# Patient Record
Sex: MALE | Race: WHITE
[De-identification: names, ages, dates, MRNs, and addresses within clinical notes are randomized per-mention and may not be internally consistent; named-entity substitution may affect disease eponyms.]

---

## 2016-07-14 VITALS
SYSTOLIC BLOOD PRESSURE: 151 MMHG | SYSTOLIC BLOOD PRESSURE: 151 MMHG | DIASTOLIC BLOOD PRESSURE: 73 MMHG | SYSTOLIC BLOOD PRESSURE: 151 MMHG | DIASTOLIC BLOOD PRESSURE: 73 MMHG | SYSTOLIC BLOOD PRESSURE: 151 MMHG | DIASTOLIC BLOOD PRESSURE: 73 MMHG | SYSTOLIC BLOOD PRESSURE: 151 MMHG | DIASTOLIC BLOOD PRESSURE: 73 MMHG | DIASTOLIC BLOOD PRESSURE: 73 MMHG

## 2017-10-25 NOTE — [facility]
EXAM: Chest, 2 views.

 

HISTORY: Shortness of breath.

 

COMPARISON: None.

 

FINDINGS: Frontal and lateral views of the chest are obtained. There is 

right infrahilar atelectasis or infiltrate. There is no consolidation, 

effusion or pneumothorax. The heart is normal in size.

 

IMPRESSION: Right infrahilar atelectasis or infiltrate.

 

Electronically signed by: Laurence Jurado MD (10/25/2017 2:57 PM) 

[facility]1

## 2017-10-27 NOTE — [facility]
PQRS Compliance Statement:

 

One or more of the following individualized dose reduction techniques were

utilized for this examination:  

1. Automated exposure control  

2. Adjustment of the mA and/or kV according to patient size  

3. Use of iterative reconstruction technique

 

 

CT CHEST WO CONTRAST

 

Clinical Indication: Lung nodule. Abnormal chest radiograph.

 

Comparison: Two-view chest, 2 days ago.

 

Technique: Helical CT imaging of the chest is performed without IV 

contrast.

 

Findings: 

Suspect bilateral hilar adenopathy, limited evaluation without IV 

contrast. There is mediastinal adenopathy. For example there is a 2 x 3 cm

subcarinal lymph node. There are 1.3 and 1.4 cm right paratracheal lymph 

nodes. The great vessels are normal caliber. Mild coronary artery disease.

Cardiac size normal, trace pericardial fluid. Cannot exclude wall 

thickening of the distal esophagus.

 

The central airways are patent. There is minimal atelectasis or scarring 

in the medial right lower lobe. There is no consolidation. Tiny nodule 

along the left major fissure in the left lower lobe may be an 

intrapulmonary lymph node. There is a tiny calcified granuloma just 

lateral. No suspicious pulmonary nodule. No pleural abnormality.

 

There is probable hepatosplenomegaly. Liver and spleen are incompletely 

imaged. Borderline enlarged gastrohepatic lymph nodes.

 

No acute bone abnormality. Degenerative endplate spurring in the thoracic 

spine.

 

IMPRESSION:

1. Mediastinal and probable bilateral hilar adenopathy. 

Borderline-enlarged gastrohepatic lymph nodes. Lymphoproliferative 

disorder or metastatic disease are in the differential. Recommend CT chest

with contrast in 3 months to reevaluate.

2. No lung consolidation. No suspicious pulmonary nodule.

3. There may be wall thickening of the distal esophagus. Considerations 

include esophagitis, Guthrie's esophagus, or esophageal neoplasm.

4. Probable hepatosplenomegaly.

 

Electronically signed by: Jude Christine MD (10/27/2017 1:35 PM) [id number]

## 2017-12-19 NOTE — SLEEP
DATE OF STUDY:  12/18/2017



SLEEP STUDY



ATTENDING PHYSICIAN:  Dr. Oswaldo Santos.



The patient is 57 years old who weighs 314 pounds with a BMI of 44.  The patient

had a history of sleep apnea diagnosed in 2008 and needed another sleep study to

requalify for a new machine.



During the night study, the patient spent 432 minutes in bed and slept for 334

minutes, with a sleep efficiency of 77%.  Sleep latency was 15 minutes with an

absent REM sleep.  Overall, sleep architecture showed increased stage I and

stage II sleep, absent slow wave and absent REM sleep.



During the initial diagnostic portion of the study, the patient slept for 125

minutes.  During this time, there were 35 obstructive apneas, no mixed or

central apneas and 123 hypopneas.  The patient's apnea-hypopnea index was 76 per

hour.  Supine sleep was not seen during the diagnostic portion and REM sleep was

not seen either.



Review of nocturnal oximetry study revealed a mean oxygen saturation of 92% with

the lowest of 79%.  88% of time oxygen saturation remained between 80% and 89%.



EKG monitoring revealed normal sinus rhythm.  No sustained arrhythmias were

observed.  Average heart rate was 73 beats per minute.



PLMs were not seen.



The patient met the criteria for CPAP initiation.  The patient could not

tolerate the pressure lower than 12 cm water.  As a result, the patient was

started on 15 cm water and titrated up to 18 cm water.  However, the patient did

well at 15 cm water.  The patient slept for 112 minutes.  The patient had supine

sleep throughout, but no REM sleep observed.  AHI was only 1 per hour and oxygen

saturations remained in the mid 80s, with a low saturation of 84%.  I would

recommend adding 1 liter of oxygen with the CPAP.



The patient used small-sized nasal pillows.



IMPRESSION:

1.  Severe sleep apnea-hypopnea syndrome with an apnea-hypopnea index of 76 per

hour.

2.  Nocturnal hypoxia secondary to combination of obstructive sleep apnea and

suspected obesity hypoventilation syndrome.

3.  No clinically significant periodic limb movement in sleep.



RECOMMENDATIONS:

1.  CPAP at 15 cm water along with 1 liter of supplemental oxygen should be used

on a nightly basis.

2.  Follow up in 4-6 weeks to assess compliance with CPAP and to document

clinical improvement.

3.  Weight loss is strongly advised.

4.  Avoid CNS depressants.

5.  Caution regarding driving until symptoms of sleep apnea resolve with the use

of CPAP.

6.  The patient to use small-sized nasal pillows.

 



______________________________

OMAYRA MAN MD



DR:  JOSIE/breana  JOB#:  [id number] / [id number]

DD:  12/19/2017 09:28  DT:  12/19/2017 09:42

## 2018-09-28 ENCOUNTER — HOSPITAL ENCOUNTER (OUTPATIENT)
Dept: HOSPITAL 61 - KCIC MRI | Age: 58
Discharge: HOME | End: 2018-09-28
Attending: ORTHOPAEDIC SURGERY
Payer: COMMERCIAL

## 2018-09-28 DIAGNOSIS — Z96.653: ICD-10-CM

## 2018-09-28 DIAGNOSIS — Z90.49: ICD-10-CM

## 2018-09-28 DIAGNOSIS — M25.411: ICD-10-CM

## 2018-09-28 DIAGNOSIS — Z87.891: ICD-10-CM

## 2018-09-28 DIAGNOSIS — M75.101: Primary | ICD-10-CM

## 2018-09-28 DIAGNOSIS — Z87.442: ICD-10-CM

## 2018-09-28 DIAGNOSIS — G47.33: ICD-10-CM

## 2018-09-28 DIAGNOSIS — M62.511: ICD-10-CM

## 2018-09-28 DIAGNOSIS — Z91.011: ICD-10-CM

## 2018-09-28 PROCEDURE — 73221 MRI JOINT UPR EXTREM W/O DYE: CPT

## 2018-09-28 NOTE — [facility]
MR of the right shoulder

 

Indication: Right shoulder pain, 3 days of hard work, awoke with upper arm

bruising.

 

Technique: Standard multiplanar sequences are obtained.

 

Findings:

Artifact: Moderate motion degradation.

 

Acromioclavicular joint:Intact.

 

Rotator cuff:

*  Supraspinatus-infraspinatus tendon: Complete full-thickness rupture 

with 4 cm retraction.

*  Subscapularis tendon: Tendinosis with partial tear

*  Muscle bulk: Moderate to severe atrophy

*  Subacromial subdeltoid bursa: Small effusion.

 

Fluid: Trace glenohumeral effusion.

Glenohumeral cartilage: No acute defect or advanced DJD.

Labrum: No acute detachment or separation.

Biceps tendon: Not seen in the joint or bicipital groove. Retracted tendon

may be present in the upper arm just below the bicipital groove, 

compatible with a retracted rupture.

 

Bones: No lesion or acute fracture.

Soft tissue: No acute findings.

 

Impression:

1. Large rotator cuff tear. Complete retracted rupture of supraspinatus 

and infraspinatus tendon, partial subscapularis tendon tear, moderate to 

severe muscle atrophy.

2. Suspect proximal biceps tendon rupture with retraction just below the 

bicipital groove.

 

Electronically signed by: Jostin Ruffin MD (9/28/2018 5:26 PM) [facility]

## 2018-11-15 ENCOUNTER — HOSPITAL ENCOUNTER (OUTPATIENT)
Dept: HOSPITAL 61 - ECHO | Age: 58
Discharge: HOME | End: 2018-11-15
Attending: INTERNAL MEDICINE
Payer: COMMERCIAL

## 2018-11-15 DIAGNOSIS — I25.10: Primary | ICD-10-CM

## 2018-11-15 PROCEDURE — 93306 TTE W/DOPPLER COMPLETE: CPT

## 2018-11-30 ENCOUNTER — HOSPITAL ENCOUNTER (OUTPATIENT)
Dept: HOSPITAL 61 - CT | Age: 58
Discharge: HOME | End: 2018-11-30
Attending: INTERNAL MEDICINE
Payer: COMMERCIAL

## 2018-11-30 DIAGNOSIS — R59.1: ICD-10-CM

## 2018-11-30 DIAGNOSIS — J98.11: Primary | ICD-10-CM

## 2018-11-30 PROCEDURE — 71260 CT THORAX DX C+: CPT

## 2018-11-30 NOTE — RAD
CT of the chest with contrast, 11/30/2018:

 

HISTORY: Sarcoidosis, shortness of breath

 

Multidetector CT imaging was performed following an IV bolus injection of 

iodinated contrast material. Comparison is made to a study from 2/23/2018.

 

The thoracic aorta is unremarkable. Mildly enlarged mediastinal lymph 

nodes are again noted. Some of the mediastinal nodes have decreased 

slightly in size. The largest node lies in the subcarinal region and 

currently demonstrates a short axis dimension of 2.0 cm compared to a 

measurement of 2.1 cm on the previous study. A precarinal lymph node which

demonstrated a short axis measurement of 1.5 cm on the previous study now 

measures 1.3 cm. Mildly prominent hilar lymph nodes are similar to on the 

previous study. No new mediastinal abnormality is detected.

 

There are mild streaky peripheral opacities posteriorly in the lower chest

suggesting atelectasis and/or scarring. Some of these opacities have 

progressed slightly. A 4 mm. perifissural nodule in the left lower chest 

is unchanged. No dense pulmonary consolidation or mass is evident. There 

is no evidence of pleural fluid.

 

The liver and spleen were not completely included on these chest images, 

however, both appear enlarged.

 

IMPRESSION:

1. Mild mediastinal and hilar adenopathy have improved slightly since 

2/23/2018.

2. Mild peripheral dependent atelectasis and/or scarring in the lower 

lobes has worsened slightly.

 

 

 

PQRS Compliance Statement:

 

One or more of the following individualized dose reduction techniques were

utilized for this examination:  

1. Automated exposure control  

2. Adjustment of the mA and/or kV according to patient size  

3. Use of iterative reconstruction technique

 

Electronically signed by: Rick Moritz, MD (11/30/2018 10:39 AM) [facility]

## 2019-10-01 ENCOUNTER — HOSPITAL ENCOUNTER (OUTPATIENT)
Dept: HOSPITAL 61 - ECHO | Age: 59
Discharge: HOME | End: 2019-10-01
Attending: INTERNAL MEDICINE
Payer: COMMERCIAL

## 2019-10-01 DIAGNOSIS — I42.9: Primary | ICD-10-CM

## 2019-10-01 PROCEDURE — 93306 TTE W/DOPPLER COMPLETE: CPT

## 2019-10-01 NOTE — CARD
MR#: [id number]

Account#: [id number]

Accession#: [id number]

Date of Study: 10/01/2019

Ordering Physician: OBDULIA DE SANTIAGO, 

Referring Physician: OBDULIA DE SANTIAGO, 

Tech: Aure Woodruff





--------------- APPROVED REPORT --------------





EXAM: Two-dimensional and M-mode echocardiogram with Doppler and color Doppler.



Other Information 

Quality : AverageHR: 88bpm



INDICATION

Cardiomyopathy 



RISK FACTORS

Previous smoker



2D DIMENSIONS 

RVDd4.3 (2.9-3.5cm)Left Atrium(2D)4.0 (1.6-4.0cm)

IVSd1.2 (0.7-1.1cm)Aortic Root(2D)3.3 (2.0-3.7cm)

LVDd5.8 (3.9-5.9cm)LVOT Diameter2.3 (1.8-2.4cm)

PWd1.1 (0.7-1.1cm)LVDs3.3 (2.5-4.0cm)

FS (%) 42.8 %.8 ml

LVEF(%)73.1 (>50%)



Aortic Valve

AoV Peak Woody.191.1cm/sAoV VTI34.3cm

AO Peak GR.14.6mmHgLVOT Peak Woody.105.1cm/s

LVOT  VTI 21.99cmAO Mean GR.8mmHg

EMMANUEL (VMAX)1.63qy7LPR   (VTI)2.77cm2



Mitral Valve

MV E Xihezubr34.4cm/sMV DECEL SFTS537il

MV A Sffieawo79.5cm/sMV LHX55vz

E/A  Ratio1.0MVA (PHT)3.76cm2



TDI

E/Lateral E'10.8E/Medial E'11.5



Pulmonary Valve

PV Peak Ssztgmhj571.3cm/sPV Peak Grad.6mmHg



Tricuspid Valve

TR P. Tpexufof831ta/sRAP CHZIZLHB7uwJk

TR Peak Gr.05ioZfBNOE33mxUu



Pulmonary Vein

S1 Mjuevfau52.8cm/sD2 Lvwfmszd91.6cm/s

PVa nrygebji416vbct



 LEFT VENTRICLE 

The left ventricle is normal size. There is mild concentric left ventricular hypertrophy. The left ve
ntricular systolic function is low normal. The Ejection Fraction is 45-50%. There is slight global hy
pokinesis of the left ventricle. Transmitral Doppler flow pattern is Grade II-pseudonormal filling dy
namics.



 RIGHT VENTRICLE 

The right ventricle is borderline dilated. There is normal right ventricular wall thickness. The righ
t ventricular systolic function is normal.



 ATRIA 

The left atrium size is normal. The right atrium is borderline dilated. The interatrial septum is int
act with no evidence for an atrial septal defect or patent foramen ovale as noted on 2-D or Doppler i
maging.



 AORTIC VALVE 

The aortic valve is thickened but opens well. Doppler and Color Flow revealed trace aortic regurgitat
ion. There is no significant aortic valvular stenosis.



 MITRAL VALVE 

The mitral valve is normal in structure and function. There is no evidence of mitral valve prolapse. 
There is no mitral valve stenosis. Doppler and Color-flow revealed trace mitral regurgitation.



 TRICUSPID VALVE 

The tricuspid valve is normal in structure and function. Doppler and Color Flow revealed trace tricus
pid regurgitation with an estimated PAP of 19 mmHg. There is no tricuspid valve prolapse or vegetatio
n. There is no tricuspid valve stenosis.



 PULMONIC VALVE 

The pulmonic valve is not well visualized. Doppler and Color Flow revealed trace pulmonic valvular re
gurgitation. There is no pulmonic valvular stenosis.



 GREAT VESSELS 

The aortic root is normal in size. The IVC is normal in size and collapses >50% with inspiration.



 PERICARDIAL EFFUSION 

There is no evidence of significant pericardial effusion.



Critical Notification

Critical Value: No



<Conclusion>

The left ventricular systolic function is low normal. The Ejection Fraction is 45-50%.

There is slight global hypokinesis of the left ventricle.



Signed by : Obdulia De Santiago, 

Electronically Approved : 10/01/2019 14:41:46

## 2019-11-15 ENCOUNTER — HOSPITAL ENCOUNTER (OUTPATIENT)
Dept: HOSPITAL 61 - KCIC CT | Age: 59
Discharge: HOME | End: 2019-11-15
Attending: INTERNAL MEDICINE
Payer: COMMERCIAL

## 2019-11-15 DIAGNOSIS — I10: ICD-10-CM

## 2019-11-15 DIAGNOSIS — R91.8: Primary | ICD-10-CM

## 2019-11-15 DIAGNOSIS — F17.200: ICD-10-CM

## 2019-11-15 DIAGNOSIS — D86.9: ICD-10-CM

## 2019-11-15 DIAGNOSIS — R59.0: ICD-10-CM

## 2019-11-15 DIAGNOSIS — R16.2: ICD-10-CM

## 2019-11-15 DIAGNOSIS — I25.10: ICD-10-CM

## 2019-11-15 PROCEDURE — 71250 CT THORAX DX C-: CPT

## 2019-11-15 NOTE — [facility]
CT CHEST WO CONTRAST 

 

Indication: Sarcoidosis

 

Technique: Noncontrast CT imaging was performed of the chest, multiplanar 

reconstruction images submitted. One or more of the following 

individualized dose reduction techniques were utilized for this 

examination:  1. Automated exposure control  2. Adjustment of the mA 

and/or kV according to patient size  3. Use of iterative reconstruction 

technique.

 

Comparison: November 30, 2018

 

Findings:  

Small 3 mm left lower lobe pulmonary nodule image 39 series 2 is 

unchanged, adjacent smaller nodule just laterally also stable. There is no

new pulmonary nodularity, pleural pericardial fluid, pneumothorax, 

infiltrate. There is some coronary calcification. Right subcarinal node 

about 1.7 cm short axis dimension is similar. Node anterior to the right 

mainstem bronchus about 1.2 cm short axis dimension is unchanged. There 

are some other smaller mediastinal nodes which are similar. No new 

significant lymphadenopathy is identified. There is hepatosplenomegaly, 

not fully included. There is right shoulder arthroplasty.

 

IMPRESSION:

1.  Mediastinal lymphadenopathy is stable, also stable small left lower 

lobe pulmonary nodules. There is no new suspicious pulmonary nodularity.

2. There is again coronary calcification.

3. There is again hepatosplenomegaly, not fully evaluated.

 

Electronically signed by: Vladimir Bella MD (11/15/2019 1:57 PM) 

[facility]

## 2020-06-26 VITALS
DIASTOLIC BLOOD PRESSURE: 79 MMHG | SYSTOLIC BLOOD PRESSURE: 136 MMHG | DIASTOLIC BLOOD PRESSURE: 79 MMHG | DIASTOLIC BLOOD PRESSURE: 79 MMHG | SYSTOLIC BLOOD PRESSURE: 136 MMHG | SYSTOLIC BLOOD PRESSURE: 136 MMHG | DIASTOLIC BLOOD PRESSURE: 79 MMHG | SYSTOLIC BLOOD PRESSURE: 136 MMHG

## 2020-08-06 ENCOUNTER — HOSPITAL ENCOUNTER (OUTPATIENT)
Dept: HOSPITAL 61 - RAD | Age: 60
Discharge: HOME | End: 2020-08-06
Attending: FAMILY MEDICINE
Payer: MEDICARE

## 2020-08-06 DIAGNOSIS — R13.13: Primary | ICD-10-CM

## 2020-08-06 PROCEDURE — 74230 X-RAY XM SWLNG FUNCJ C+: CPT

## 2020-08-06 NOTE — RAD
PROCEDURE: VIDEO SWALLOW STUDY

 

CLINICAL INDICATION / HISTORY: Reason: DYSPHAGIA, 3.2 MIN FLUORO TIME / 

Spl. Instructions: BARIUM ORDERED / History: .

 

TECHNIQUE: Real-time fluoroscopic imaging examination was performed in 

conjunction with speech therapy. The patient was administered barium 

labeled thin liquids, honey, pudding, and solid consistency compounds.

 

FLUOROSCOPY TIME: 3.2 minutes.

 

Number of Images: 0

 

COMPARISON: Chest x-ray 6/21/2020

 

FINDINGS: The patient exhibited normal oral control.  There is minimal 

spillage of ingested material into the pharynx . Minimal delayed swallow 

initiation. Small amount of residual post swallow in the valleculae and 

piriform sinuses. There was silent aspiration with thin liquids identified

as well as laryngeal penetration with thin and nectar thick consistencies,

including deep laryngeal penetration.

 

 

IMPRESSION: Dysphagia of the pharyngeal phase.  Silent aspiration with 

thin liquids.  Please refer to speech pathology notes for complete details

and recommendations.

 

Electronically signed by: Marian Tompkins MD (8/6/2020 5:32 PM) 

[id number]

## 2020-09-02 ENCOUNTER — HOSPITAL ENCOUNTER (OUTPATIENT)
Dept: HOSPITAL 61 - KCIC MRI | Age: 60
Discharge: HOME | End: 2020-09-02
Attending: ORTHOPAEDIC SURGERY
Payer: MEDICARE

## 2020-09-02 DIAGNOSIS — M62.512: ICD-10-CM
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
## 2020-09-02 DIAGNOSIS — S46.912A: Primary | ICD-10-CM
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
## 2020-09-02 DIAGNOSIS — M25.412: ICD-10-CM
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
## 2020-09-02 DIAGNOSIS — M65.812: ICD-10-CM
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
## 2020-09-02 DIAGNOSIS — M19.012: ICD-10-CM

## 2020-09-02 PROCEDURE — 73221 MRI JOINT UPR EXTREM W/O DYE: CPT

## 2020-09-02 NOTE — [facility]
EXAMINATION: MRI LEFT SHOULDER WITHOUT IV CONTRAST

 

CLINICAL HISTORY: Left shoulder pain, chronic. Impingement syndrome 

 

TECHNIQUE: Multiplanar multisequential images obtained through the 

shoulder without intravenous contrast.

 

COMPARISON: Left shoulder radiographs 8/27/2020

 

 

FINDINGS:  

 

TENDONS: 

- Supraspinatus: Full-thickness full width tear and retraction to the 

glenohumeral joint.

- Infraspinatus: Full-thickness full width tear and retraction to the 

glenohumeral joint.

- Subscapularis: Moderate to marked tendinosis with articular sided 

fraying in the superior fibers.

- Teres Minor: Intact.

- Biceps Tendon: Near-complete tear with a few thin fibers potentially 

remaining intact.

 

MUSCLES: Moderate to marked atrophy and mild fatty replacement of the 

supraspinatus and infraspinatus muscles.

 

LABRUM: Circumferential labral degeneration without discrete tear.

 

GLENOHUMERAL JOINT: 

- Joint Fluid: Small to moderate joint effusion with mild synovitis. 5 mm 

posterior joint body.

- Cartilage: Moderate areas of high grade partial-thickness chondral loss 

in the humeral head.

-Other: Superior subluxation of the humeral head relative to the glenoid.

 

ACROMIOCLAVICULAR JOINT: Mild degenerative changes.

 

BONES/MARROW: No evidence of acute fracture or suspicious marrow replacing

process.

 

OTHER: No other significant abnormality identified.  

 

 

IMPRESSION:  

 

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 

retraction and muscle atrophy.

 

Essentially complete biceps tendon tear.

 

Glenohumeral degenerative changes with small joint body as described.

 

Electronically signed by: Cabrera Bhakta DO (9/2/2020 3:30 PM) [id number]

## 2020-09-24 ENCOUNTER — HOSPITAL ENCOUNTER (OUTPATIENT)
Dept: HOSPITAL 61 - SURGPAT | Age: 60
Discharge: HOME | End: 2020-09-24
Attending: ORTHOPAEDIC SURGERY
Payer: MEDICARE

## 2020-09-24 DIAGNOSIS — Z01.812: Primary | ICD-10-CM

## 2020-09-24 DIAGNOSIS — T84.84XA: ICD-10-CM

## 2020-09-24 DIAGNOSIS — Z96.651: ICD-10-CM

## 2020-09-24 LAB
ALBUMIN SERPL-MCNC: 3.7 G/DL (ref 3.4–5)
ANION GAP SERPL CALC-SCNC: 9 MMOL/L (ref 6–14)
APTT BLD: 31 SEC (ref 24–38)
BASOPHILS # BLD AUTO: 0 X10^3/UL (ref 0–0.2)
BASOPHILS NFR BLD: 0 % (ref 0–3)
BUN SERPL-MCNC: 20 MG/DL (ref 8–26)
CALCIUM SERPL-MCNC: 9.2 MG/DL (ref 8.5–10.1)
CHLORIDE SERPL-SCNC: 102 MMOL/L (ref 98–107)
CO2 SERPL-SCNC: 27 MMOL/L (ref 21–32)
CREAT SERPL-MCNC: 1.2 MG/DL (ref 0.7–1.3)
CRP SERPL-MCNC: 4.2 MG/L (ref 0–3.3)
EOSINOPHIL NFR BLD: 0 X10^3/UL (ref 0–0.7)
EOSINOPHIL NFR BLD: 1 % (ref 0–3)
ERYTHROCYTE [DISTWIDTH] IN BLOOD BY AUTOMATED COUNT: 13.2 % (ref 11.5–14.5)
GFR SERPLBLD BASED ON 1.73 SQ M-ARVRAT: 62 ML/MIN
GLUCOSE SERPL-MCNC: 108 MG/DL (ref 70–99)
HCT VFR BLD CALC: 40.3 % (ref 39–53)
HGB BLD-MCNC: 13.8 G/DL (ref 13–17.5)
LYMPHOCYTES # BLD: 1.6 X10^3/UL (ref 1–4.8)
LYMPHOCYTES NFR BLD AUTO: 37 % (ref 24–48)
MCH RBC QN AUTO: 30 PG (ref 25–35)
MCHC RBC AUTO-ENTMCNC: 34 G/DL (ref 31–37)
MCV RBC AUTO: 87 FL (ref 79–100)
MONO #: 0.4 X10^3/UL (ref 0–1.1)
MONOCYTES NFR BLD: 8 % (ref 0–9)
NEUT #: 2.4 X10^3/UL (ref 1.8–7.7)
NEUTROPHILS NFR BLD AUTO: 54 % (ref 31–73)
PLATELET # BLD AUTO: 103 X10^3/UL (ref 140–400)
POTASSIUM SERPL-SCNC: 4.5 MMOL/L (ref 3.5–5.1)
PROTHROMBIN TIME: 13.7 SEC (ref 11.7–14)
RBC # BLD AUTO: 4.61 X10^6/UL (ref 4.3–5.7)
SODIUM SERPL-SCNC: 138 MMOL/L (ref 136–145)
WBC # BLD AUTO: 4.5 X10^3/UL (ref 4–11)

## 2020-09-24 PROCEDURE — 83036 HEMOGLOBIN GLYCOSYLATED A1C: CPT

## 2020-09-24 PROCEDURE — 85610 PROTHROMBIN TIME: CPT

## 2020-09-24 PROCEDURE — 36415 COLL VENOUS BLD VENIPUNCTURE: CPT

## 2020-09-24 PROCEDURE — 85730 THROMBOPLASTIN TIME PARTIAL: CPT

## 2020-09-24 PROCEDURE — 82040 ASSAY OF SERUM ALBUMIN: CPT

## 2020-09-24 PROCEDURE — 86140 C-REACTIVE PROTEIN: CPT

## 2020-09-24 PROCEDURE — 85025 COMPLETE CBC W/AUTO DIFF WBC: CPT

## 2020-09-24 PROCEDURE — 87641 MR-STAPH DNA AMP PROBE: CPT

## 2020-09-24 PROCEDURE — 71046 X-RAY EXAM CHEST 2 VIEWS: CPT

## 2020-09-24 PROCEDURE — 80048 BASIC METABOLIC PNL TOTAL CA: CPT

## 2020-09-24 PROCEDURE — 82306 VITAMIN D 25 HYDROXY: CPT

## 2020-09-24 NOTE — RAD
CHEST PA   LATERAL 

 

INDICATION: PRE-OP EVAL FOR KNEE  

 

COMPARISON STUDY: None.

 

FINDINGS:

 

Lungs: Normal lung volume. No pulmonary mass or consolidation. The 

tracheobronchial tree and hilar structures are normal.

 

Pleura: No pleural effusion or pneumothorax.

 

Heart and Mediastinum: The cardiomediastinal silhouette is normal. Mild 

tortuosity of the thoracic aorta.

 

Bones and Soft Tissues: Degenerative changes of the spine.

 

IMPRESSION:  

No acute cardiopulmonary process.

 

Electronically signed by: Vladimir Holguin MD (9/24/2020 1:55 PM) [id number]

## 2020-09-25 LAB — HBA1C MFR BLD: 6 % (ref 4.8–5.6)

## 2020-10-09 ENCOUNTER — HOSPITAL ENCOUNTER (OUTPATIENT)
Dept: HOSPITAL 61 - LAB | Age: 60
End: 2020-10-09
Attending: ORTHOPAEDIC SURGERY
Payer: MEDICARE

## 2020-10-09 DIAGNOSIS — T84.84XA: ICD-10-CM

## 2020-10-09 DIAGNOSIS — Z01.812: Primary | ICD-10-CM

## 2020-10-09 DIAGNOSIS — Z96.651: ICD-10-CM

## 2020-10-09 DIAGNOSIS — Z20.828: ICD-10-CM

## 2020-10-16 VITALS — SYSTOLIC BLOOD PRESSURE: 175 MMHG | DIASTOLIC BLOOD PRESSURE: 69 MMHG

## 2020-12-03 ENCOUNTER — HOSPITAL ENCOUNTER (OUTPATIENT)
Dept: HOSPITAL 61 - SURGPAT | Age: 60
End: 2020-12-03
Attending: ORTHOPAEDIC SURGERY
Payer: MEDICARE

## 2020-12-03 DIAGNOSIS — Z96.612: ICD-10-CM

## 2020-12-03 DIAGNOSIS — Z01.812: Primary | ICD-10-CM

## 2020-12-03 DIAGNOSIS — Z20.828: ICD-10-CM

## 2020-12-03 DIAGNOSIS — M12.812: ICD-10-CM

## 2020-12-03 LAB
ALBUMIN SERPL-MCNC: 4.1 G/DL (ref 3.4–5)
ANION GAP SERPL CALC-SCNC: 11 MMOL/L (ref 6–14)
APTT BLD: 29 SEC (ref 24–38)
BASOPHILS # BLD AUTO: 0 X10^3/UL (ref 0–0.2)
BASOPHILS NFR BLD: 1 % (ref 0–3)
BUN SERPL-MCNC: 24 MG/DL (ref 8–26)
CALCIUM SERPL-MCNC: 9.9 MG/DL (ref 8.5–10.1)
CHLORIDE SERPL-SCNC: 102 MMOL/L (ref 98–107)
CO2 SERPL-SCNC: 26 MMOL/L (ref 21–32)
CREAT SERPL-MCNC: 0.9 MG/DL (ref 0.7–1.3)
CRP SERPL-MCNC: 4 MG/L (ref 0–3.3)
EOSINOPHIL NFR BLD: 0.1 X10^3/UL (ref 0–0.7)
EOSINOPHIL NFR BLD: 2 % (ref 0–3)
ERYTHROCYTE [DISTWIDTH] IN BLOOD BY AUTOMATED COUNT: 14.2 % (ref 11.5–14.5)
GFR SERPLBLD BASED ON 1.73 SQ M-ARVRAT: 86.4 ML/MIN
GLUCOSE SERPL-MCNC: 125 MG/DL (ref 70–99)
HCT VFR BLD CALC: 40.4 % (ref 39–53)
HGB BLD-MCNC: 13.4 G/DL (ref 13–17.5)
LYMPHOCYTES # BLD: 2 X10^3/UL (ref 1–4.8)
LYMPHOCYTES NFR BLD AUTO: 40 % (ref 24–48)
MCH RBC QN AUTO: 27 PG (ref 25–35)
MCHC RBC AUTO-ENTMCNC: 33 G/DL (ref 31–37)
MCV RBC AUTO: 82 FL (ref 79–100)
MONO #: 0.5 X10^3/UL (ref 0–1.1)
MONOCYTES NFR BLD: 10 % (ref 0–9)
NEUT #: 2.3 X10^3/UL (ref 1.8–7.7)
NEUTROPHILS NFR BLD AUTO: 48 % (ref 31–73)
PLATELET # BLD AUTO: 114 X10^3/UL (ref 140–400)
POTASSIUM SERPL-SCNC: 4.3 MMOL/L (ref 3.5–5.1)
PROTHROMBIN TIME: 14.9 SEC (ref 11.7–14)
RBC # BLD AUTO: 4.94 X10^6/UL (ref 4.3–5.7)
SODIUM SERPL-SCNC: 139 MMOL/L (ref 136–145)
WBC # BLD AUTO: 4.9 X10^3/UL (ref 4–11)

## 2020-12-03 PROCEDURE — 83036 HEMOGLOBIN GLYCOSYLATED A1C: CPT

## 2020-12-03 PROCEDURE — 82306 VITAMIN D 25 HYDROXY: CPT

## 2020-12-03 PROCEDURE — U0003 INFECTIOUS AGENT DETECTION BY NUCLEIC ACID (DNA OR RNA); SEVERE ACUTE RESPIRATORY SYNDROME CORONAVIRUS 2 (SARS-COV-2) (CORONAVIRUS DISEASE [COVID-19]), AMPLIFIED PROBE TECHNIQUE, MAKING USE OF HIGH THROUGHPUT TECHNOLOGIES AS DESCRIBED BY CMS-2020-01-R: HCPCS

## 2020-12-03 PROCEDURE — 86140 C-REACTIVE PROTEIN: CPT

## 2020-12-03 PROCEDURE — 85730 THROMBOPLASTIN TIME PARTIAL: CPT

## 2020-12-03 PROCEDURE — 80048 BASIC METABOLIC PNL TOTAL CA: CPT

## 2020-12-03 PROCEDURE — 85610 PROTHROMBIN TIME: CPT

## 2020-12-03 PROCEDURE — 87641 MR-STAPH DNA AMP PROBE: CPT

## 2020-12-03 PROCEDURE — 85025 COMPLETE CBC W/AUTO DIFF WBC: CPT

## 2020-12-03 PROCEDURE — 82040 ASSAY OF SERUM ALBUMIN: CPT

## 2020-12-04 LAB — HBA1C MFR BLD: 6.4 % (ref 4.8–5.6)

## 2020-12-10 VITALS — DIASTOLIC BLOOD PRESSURE: 62 MMHG | SYSTOLIC BLOOD PRESSURE: 110 MMHG

## 2021-03-02 ENCOUNTER — HOSPITAL ENCOUNTER (OUTPATIENT)
Dept: HOSPITAL 61 - SURGPAT | Age: 61
End: 2021-03-02
Attending: ORTHOPAEDIC SURGERY
Payer: MEDICARE

## 2021-03-02 DIAGNOSIS — T84.098A: ICD-10-CM

## 2021-03-02 DIAGNOSIS — Z20.822: ICD-10-CM

## 2021-03-02 DIAGNOSIS — Z01.812: Primary | ICD-10-CM

## 2021-03-02 LAB
ALBUMIN SERPL-MCNC: 3.7 G/DL (ref 3.4–5)
ANION GAP SERPL CALC-SCNC: 11 MMOL/L (ref 6–14)
APTT BLD: 29 SEC (ref 24–38)
BASOPHILS # BLD AUTO: 0 X10^3/UL (ref 0–0.2)
BASOPHILS NFR BLD: 0 % (ref 0–3)
BUN SERPL-MCNC: 21 MG/DL (ref 8–26)
CALCIUM SERPL-MCNC: 9.2 MG/DL (ref 8.5–10.1)
CHLORIDE SERPL-SCNC: 102 MMOL/L (ref 98–107)
CO2 SERPL-SCNC: 27 MMOL/L (ref 21–32)
CREAT SERPL-MCNC: 0.9 MG/DL (ref 0.7–1.3)
CRP SERPL-MCNC: 15.2 MG/L (ref 0–3.3)
EOSINOPHIL NFR BLD: 0 X10^3/UL (ref 0–0.7)
EOSINOPHIL NFR BLD: 1 % (ref 0–3)
ERYTHROCYTE [DISTWIDTH] IN BLOOD BY AUTOMATED COUNT: 17.6 % (ref 11.5–14.5)
GFR SERPLBLD BASED ON 1.73 SQ M-ARVRAT: 86.4 ML/MIN
GLUCOSE SERPL-MCNC: 162 MG/DL (ref 70–99)
HCT VFR BLD CALC: 36.5 % (ref 39–53)
HGB BLD-MCNC: 11.5 G/DL (ref 13–17.5)
LYMPHOCYTES # BLD: 1 X10^3/UL (ref 1–4.8)
LYMPHOCYTES NFR BLD AUTO: 28 % (ref 24–48)
MCH RBC QN AUTO: 25 PG (ref 25–35)
MCHC RBC AUTO-ENTMCNC: 32 G/DL (ref 31–37)
MCV RBC AUTO: 78 FL (ref 79–100)
MONO #: 0.3 X10^3/UL (ref 0–1.1)
MONOCYTES NFR BLD: 10 % (ref 0–9)
NEUT #: 2.1 X10^3/UL (ref 1.8–7.7)
NEUTROPHILS NFR BLD AUTO: 61 % (ref 31–73)
PLATELET # BLD AUTO: 82 X10^3/UL (ref 140–400)
POTASSIUM SERPL-SCNC: 4.4 MMOL/L (ref 3.5–5.1)
PROTHROMBIN TIME: 14.4 SEC (ref 11.7–14)
RBC # BLD AUTO: 4.7 X10^6/UL (ref 4.3–5.7)
SODIUM SERPL-SCNC: 140 MMOL/L (ref 136–145)
WBC # BLD AUTO: 3.4 X10^3/UL (ref 4–11)

## 2021-03-02 PROCEDURE — 80048 BASIC METABOLIC PNL TOTAL CA: CPT

## 2021-03-02 PROCEDURE — 82040 ASSAY OF SERUM ALBUMIN: CPT

## 2021-03-02 PROCEDURE — 85730 THROMBOPLASTIN TIME PARTIAL: CPT

## 2021-03-02 PROCEDURE — 87641 MR-STAPH DNA AMP PROBE: CPT

## 2021-03-02 PROCEDURE — 86140 C-REACTIVE PROTEIN: CPT

## 2021-03-02 PROCEDURE — U0003 INFECTIOUS AGENT DETECTION BY NUCLEIC ACID (DNA OR RNA); SEVERE ACUTE RESPIRATORY SYNDROME CORONAVIRUS 2 (SARS-COV-2) (CORONAVIRUS DISEASE [COVID-19]), AMPLIFIED PROBE TECHNIQUE, MAKING USE OF HIGH THROUGHPUT TECHNOLOGIES AS DESCRIBED BY CMS-2020-01-R: HCPCS

## 2021-03-02 PROCEDURE — 85025 COMPLETE CBC W/AUTO DIFF WBC: CPT

## 2021-03-02 PROCEDURE — 85610 PROTHROMBIN TIME: CPT

## 2021-03-05 ENCOUNTER — HOSPITAL ENCOUNTER (OUTPATIENT)
Dept: HOSPITAL 61 - SURG | Age: 61
Discharge: HOME | End: 2021-03-05
Attending: ORTHOPAEDIC SURGERY
Payer: MEDICARE

## 2021-03-05 VITALS — BODY MASS INDEX: 43.09 KG/M2 | HEIGHT: 70 IN | WEIGHT: 301 LBS

## 2021-03-05 VITALS
SYSTOLIC BLOOD PRESSURE: 142 MMHG | DIASTOLIC BLOOD PRESSURE: 86 MMHG | SYSTOLIC BLOOD PRESSURE: 142 MMHG | DIASTOLIC BLOOD PRESSURE: 86 MMHG | DIASTOLIC BLOOD PRESSURE: 86 MMHG | SYSTOLIC BLOOD PRESSURE: 142 MMHG | DIASTOLIC BLOOD PRESSURE: 86 MMHG | DIASTOLIC BLOOD PRESSURE: 86 MMHG | DIASTOLIC BLOOD PRESSURE: 86 MMHG | SYSTOLIC BLOOD PRESSURE: 142 MMHG | SYSTOLIC BLOOD PRESSURE: 142 MMHG | DIASTOLIC BLOOD PRESSURE: 86 MMHG | SYSTOLIC BLOOD PRESSURE: 142 MMHG | DIASTOLIC BLOOD PRESSURE: 86 MMHG | DIASTOLIC BLOOD PRESSURE: 86 MMHG | SYSTOLIC BLOOD PRESSURE: 142 MMHG | SYSTOLIC BLOOD PRESSURE: 142 MMHG | SYSTOLIC BLOOD PRESSURE: 142 MMHG

## 2021-03-05 DIAGNOSIS — Z72.89: ICD-10-CM

## 2021-03-05 DIAGNOSIS — E11.9: ICD-10-CM

## 2021-03-05 DIAGNOSIS — Z79.899: ICD-10-CM

## 2021-03-05 DIAGNOSIS — I10: ICD-10-CM

## 2021-03-05 DIAGNOSIS — Z96.612: Primary | ICD-10-CM

## 2021-03-05 DIAGNOSIS — F17.210: ICD-10-CM

## 2021-03-05 DIAGNOSIS — Z98.890: ICD-10-CM

## 2021-03-05 DIAGNOSIS — E66.9: ICD-10-CM

## 2021-03-05 DIAGNOSIS — M19.90: ICD-10-CM

## 2021-03-05 DIAGNOSIS — Z85.828: ICD-10-CM

## 2021-03-05 DIAGNOSIS — K21.9: ICD-10-CM

## 2021-03-05 DIAGNOSIS — F41.9: ICD-10-CM

## 2021-03-05 DIAGNOSIS — F32.9: ICD-10-CM

## 2021-03-05 DIAGNOSIS — G47.30: ICD-10-CM

## 2021-03-05 LAB
BASOPHILS # BLD AUTO: 0 X10^3/UL (ref 0–0.2)
BASOPHILS NFR BLD: 0 % (ref 0–3)
EOSINOPHIL NFR BLD: 0 X10^3/UL (ref 0–0.7)
EOSINOPHIL NFR BLD: 1 % (ref 0–3)
ERYTHROCYTE [DISTWIDTH] IN BLOOD BY AUTOMATED COUNT: 18.1 % (ref 11.5–14.5)
HCT VFR BLD CALC: 36.2 % (ref 39–53)
HGB BLD-MCNC: 11.3 G/DL (ref 13–17.5)
LYMPHOCYTES # BLD: 1.1 X10^3/UL (ref 1–4.8)
LYMPHOCYTES NFR BLD AUTO: 33 % (ref 24–48)
MCH RBC QN AUTO: 24 PG (ref 25–35)
MCHC RBC AUTO-ENTMCNC: 31 G/DL (ref 31–37)
MCV RBC AUTO: 78 FL (ref 79–100)
MONO #: 0.4 X10^3/UL (ref 0–1.1)
MONOCYTES NFR BLD: 11 % (ref 0–9)
NEUT #: 1.8 X10^3/UL (ref 1.8–7.7)
NEUTROPHILS NFR BLD AUTO: 55 % (ref 31–73)
PLATELET # BLD AUTO: 82 X10^3/UL (ref 140–400)
RBC # BLD AUTO: 4.65 X10^6/UL (ref 4.3–5.7)
WBC # BLD AUTO: 3.2 X10^3/UL (ref 4–11)

## 2021-03-05 PROCEDURE — A4928 SURGICAL MASK: HCPCS

## 2021-03-05 PROCEDURE — A6402 STERILE GAUZE <= 16 SQ IN: HCPCS

## 2021-03-05 PROCEDURE — C1776 JOINT DEVICE (IMPLANTABLE): HCPCS

## 2021-03-05 PROCEDURE — A6223 GAUZE >16<=48 NO W/SAL W/O B: HCPCS

## 2021-03-05 PROCEDURE — 85025 COMPLETE CBC W/AUTO DIFF WBC: CPT

## 2021-03-05 PROCEDURE — 86850 RBC ANTIBODY SCREEN: CPT

## 2021-03-05 PROCEDURE — 36415 COLL VENOUS BLD VENIPUNCTURE: CPT

## 2021-03-05 PROCEDURE — A4930 STERILE, GLOVES PER PAIR: HCPCS

## 2021-03-05 PROCEDURE — A6550 NEG PRES WOUND THER DRSG SET: HCPCS

## 2021-03-05 PROCEDURE — A6253 ABSORPT DRG > 48 SQ IN W/O B: HCPCS

## 2021-03-05 PROCEDURE — 86901 BLOOD TYPING SEROLOGIC RH(D): CPT

## 2021-03-05 PROCEDURE — 82962 GLUCOSE BLOOD TEST: CPT

## 2021-03-05 PROCEDURE — 23472 RECONSTRUCT SHOULDER JOINT: CPT

## 2021-03-05 PROCEDURE — 86900 BLOOD TYPING SEROLOGIC ABO: CPT

## 2021-03-05 PROCEDURE — A4322 IRRIGATION SYRINGE: HCPCS

## 2021-03-05 PROCEDURE — A4565 SLINGS: HCPCS

## 2021-03-05 RX ADMIN — INSULIN LISPRO PRN UNIT: 100 INJECTION, SOLUTION INTRAVENOUS; SUBCUTANEOUS at 08:56

## 2021-03-05 RX ADMIN — INSULIN LISPRO PRN UNIT: 100 INJECTION, SOLUTION INTRAVENOUS; SUBCUTANEOUS at 07:06

## 2021-03-05 RX ADMIN — INSULIN LISPRO PRN UNIT: 100 INJECTION, SOLUTION INTRAVENOUS; SUBCUTANEOUS at 09:59

## 2021-03-05 NOTE — DISCH
DISCHARGE INSTRUCTIONS


Condition on Discharge


Condition on Discharge:  Stable





Activity After Discharge


Activity Instructions for Disc:  Other, see below


Bathing Instructions:  Shower-keep dressing dry, No Tub Bath until see 


Lifting Instructions after Dis:  No heavy lifting, No pulling or pushing, Do not

lift >10 pounds


Exercise Instruction after Dis:  Exercise per therapy


Driving Instructions after Dis:  Do not drive today


Weight Bearing Status after Di:  No restrictions





Diet after Discharge


Diet after Discharge:  Regular


Additional Diet Restrictions:  Drink 8-10 12oz glasses of water / day


Liquid Texture:  Thin Liquid





Wound Incision Care


Wound/Incision Care:  Ice to area for comfort, Do not change dressing


Wound Care Equipment:  Dressings





Community/Resources/Services


Services at Discharge:  PT EVALUATE & TREAT (May resume PT on existing protocol,

avoid reaching around the back for 2 months postop)





Contacting the  after DC


Call your doctor for:  Concerns you may have





Follow-Up


Follow up with:  Dr. Jenkins 10 days





Treatment/Equipment after DC


Adaptive Equipment Issued:  None











FREDDY JENKINS MD            Mar 5, 2021 08:03

## 2021-03-05 NOTE — PDOC4
Operative Note


Operative Note


Date of surgery: 3/5/2021





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 

shoulder arthroplasty





Postoperative diagnosis: Same





Operative procedure: Revision of polyethylene component left reverse shoulder





Surgeon: Alice





Assistant Hamzah eMek first assist





Anesthesia: Gen. plus scalene block





Estimated blood loss: 150 mL





Complications: None





Operative indications: Please see my preoperative history and physical for 

detailed operative indications and note that patient had a reverse shoulder 

arthroplasty that was doing very well until just before a recent clinic visit 

when he had had a history of a couple of falls on the other shoulder and noted 

increased pain and clunking in the left shoulder.  On evaluation in the clinic 

he was noted to have dissociation and displacement of the polyethylene wear 

surface of the reverse shoulder arthroplasty and I had gone over with him the 

necessity of exploration of this area and revision along with the risks benefits

postoperative course including the possibility of infection instability 

premature wear or loosening nerve or blood vessel damage medical or other 

anesthetic complications among others all his questions were answered he wishes 

to proceed with surgical evaluation and treatment





Operative text: Patient was identified procedure verified patient placed in the 

supine position on the operating table. After adequate amounts of general 

anesthesia plus a pre-existing scalene block were obtained he was placed in the 

Tmax head rest and beachchair position with the spider arm france and the left 

arm prepped and draped in standard sterile fashion. After timeout was performed 

patient procedure identified and verified a deltopectoral incision was made and 

the distal insertion of the deltoid was bluntly peeled off the bone at its 

periosteal insertion and subscapularis was released and tagged.  The loose 

polyethylene component was retrieved from the joint and both the trabecular met

al stem and the glenosphere were noted to be solidly placed with no evidence of 

any loosening and no significant scuffing on the glenosphere component.  Trial 

fitting was carried out with a size 6 standard polyethylene trial component 

which gave excellent stability and allowed him adequate motion.  I overall 

judged however that a size 6 with a retentive polyethylene component provided a 

better overall fit.  There was no evidence of any impingement or indications of 

any hardware malalignment that would result in concerns for future instability. 

After thorough irrigation carried out normal saline solution bleeding points 

controlled by electrocautery, a 40 mm diameter +6 mm retentive polyethylene 

spacer was impacted into the humeral stem and was noted to be solidly fixated.  

The shoulder was reduced and noted to have excellent range of motion and no 

instability or impingement present.  Joint was irrigated with dilute Betadine 

solution and further with normal saline solution with pulse lavage.  

Subscapularis was repaired with max braid suture 1 g vancomycin was placed in 

the joint subcutaneous closure in a layered fashion with buried Vicryl suture 

skin closure with subcuticular Monocryl and a ragini dressing was placed.  Patient

was returned to recovery room in stable condition having tolerated procedure 

well.  Hamzah perez was present for the procedure and assisted 

in patient positioning prepping draping retraction closure dressings











FREDDY REED MD            Mar 5, 2021 20:31

## 2021-04-13 ENCOUNTER — HOSPITAL ENCOUNTER (OUTPATIENT)
Dept: HOSPITAL 61 - ECHO | Age: 61
End: 2021-04-13
Attending: INTERNAL MEDICINE
Payer: MEDICARE

## 2021-04-13 DIAGNOSIS — I42.9: ICD-10-CM

## 2021-04-13 DIAGNOSIS — I51.7: Primary | ICD-10-CM

## 2021-04-13 PROCEDURE — 93306 TTE W/DOPPLER COMPLETE: CPT

## 2021-04-14 NOTE — CARD
MR#: [id number]

Account#: [id number]

Accession#: [id number]

Date of Study: 04/13/2021

Ordering Physician: OBDULIA GUERRIER, 

Referring Physician: OBDULIA GUERRIER, 

Tech: Ananya Kacy, [facility]





--------------- APPROVED REPORT --------------





EXAM: Two-dimensional and M-mode echocardiogram with Doppler and color Doppler.



Other Information 

Quality : AverageHR: 74bpm

Technically limited study due to  body habitus.



INDICATION

Cardiomyopathy 

Non Ischemic Cardiomyopathy



RISK FACTORS

Hypertension 



2D DIMENSIONS 

RVDd4.3 (2.9-3.5cm)Left Atrium(2D)4.3 (1.6-4.0cm)

IVSd1.3 (0.7-1.1cm)Aortic Root(2D)3.5 (2.0-3.7cm)

LVDd6.2 (3.9-5.9cm)LVOT Diameter2.2 (1.8-2.4cm)

PWd1.4 (0.7-1.1cm)LVDs3.7 (2.5-4.0cm)

FS (%) 41.3 %.2 ml

LVEF(%)60.2 (>50%)



Aortic Valve

AoV Peak Woody.208.0cm/sAoV VTI38.1cm

AO Peak GR.17.3mmHgLVOT Peak Woody.92.7cm/s

LVOT  VTI 19.58cmAO Mean GR.10mmHg

EMMANUEL (VMAX)1.33rx0HHT   (VTI)1.93cm2



Mitral Valve

MV E Depeketn39.4cm/sMV DECEL [id number]

MV A Ituspccv94.5cm/sMV [id number]

E/A  Ratio0.7MVA (PHT)2.92cm2



TDI

E/Lateral E'7.7E/Medial E'8.4



Pulmonary Valve

PV Peak Mmtcccox870.4cm/sPV Peak Grad.4mmHg



Tricuspid Valve

TR P. Tltrctxc062pe/sTR Peak Gr.18mmHg



Pulmonary Vein

S1 Mjkelght93.1cm/sD2 Iembgdvc92.2cm/s

PVa [id number]



 LEFT VENTRICLE 

The Left Ventricle is mildly dilated. There is moderate concentric left ventricular hypertrophy. The 
left ventricular systolic function is mildly diminished. The Ejection Fraction is 45-50%. There is no
rmal LV segmental wall motion. Transmitral Doppler flow pattern is Grade I-abnormal relaxation patter
n.



 RIGHT VENTRICLE 

The right ventricle is borderline dilated. There is normal right ventricular wall thickness. The righ
t ventricular systolic function is normal.



 ATRIA 

The left atrium is borderline dilated. The right atrium is borderline dilated. The interatrial septum
 is intact with no evidence for an atrial septal defect or patent foramen ovale as noted on 2-D or Do
ppler imaging.



 AORTIC VALVE 

The aortic valve is thickened but opens well. Doppler and Color Flow revealed no significant aortic r
egurgitation. There is no significant aortic valvular stenosis. Calculated aortic valve area is 1.78 
cm2 with maximum pressure gradient of 20 mmHg and mean pressure gradient of 11 mmHg.



 MITRAL VALVE 

The mitral valve is normal in structure and function. There is no evidence of mitral valve prolapse. 
There is no mitral valve stenosis. Doppler and Color-flow revealed trace mitral regurgitation.



 TRICUSPID VALVE 

The tricuspid valve is normal in structure and function. Doppler and Color Flow revealed trace tricus
pid regurgitation with an estimated PAP of 23 mmHg. There is no tricuspid valve stenosis.



 PULMONIC VALVE 

The pulmonic valve is not well visualized. Doppler and Color Flow revealed trace pulmonic valvular re
gurgitation.



 GREAT VESSELS 

The aortic root is normal in size. The ascending aorta is normal in size. The IVC is normal in size a
nd collapses >50% with inspiration.



 PERICARDIAL EFFUSION 

There is no evidence of significant pericardial effusion.



Critical Notification

Critical Value: No



<Conclusion>

The left ventricular systolic function is mildly diminished.

The Ejection Fraction is 45-50%.

Transmitral Doppler flow pattern is Grade I-abnormal relaxation pattern.

Trace mitral regurgitation.

Trace tricuspid regurgitation with an estimated PAP of 23 mmHg.

There is no evidence of significant pericardial effusion.



Signed by : Jose G John, 

Electronically Approved : 04/14/2021 09:38:13

## 2021-05-10 ENCOUNTER — HOSPITAL ENCOUNTER (OUTPATIENT)
Dept: HOSPITAL 61 - PNCL | Age: 61
Discharge: HOME | End: 2021-05-10
Attending: ANESTHESIOLOGY
Payer: MEDICARE

## 2021-05-10 DIAGNOSIS — F32.9: ICD-10-CM

## 2021-05-10 DIAGNOSIS — Z96.653: ICD-10-CM

## 2021-05-10 DIAGNOSIS — Z98.890: ICD-10-CM

## 2021-05-10 DIAGNOSIS — I13.2: ICD-10-CM

## 2021-05-10 DIAGNOSIS — Z79.899: ICD-10-CM

## 2021-05-10 DIAGNOSIS — M19.90: ICD-10-CM

## 2021-05-10 DIAGNOSIS — M25.561: Primary | ICD-10-CM

## 2021-05-10 DIAGNOSIS — Z82.49: ICD-10-CM

## 2021-05-10 DIAGNOSIS — F41.9: ICD-10-CM

## 2021-05-10 DIAGNOSIS — Z87.442: ICD-10-CM

## 2021-05-10 DIAGNOSIS — Z80.8: ICD-10-CM

## 2021-05-10 DIAGNOSIS — E11.22: ICD-10-CM

## 2021-05-10 DIAGNOSIS — M25.562: ICD-10-CM

## 2021-05-10 DIAGNOSIS — K21.9: ICD-10-CM

## 2021-05-10 DIAGNOSIS — E66.9: ICD-10-CM

## 2021-05-10 DIAGNOSIS — Z72.89: ICD-10-CM

## 2021-05-10 DIAGNOSIS — N18.6: ICD-10-CM

## 2021-05-10 DIAGNOSIS — Z91.011: ICD-10-CM

## 2021-05-10 DIAGNOSIS — G47.33: ICD-10-CM

## 2021-05-10 DIAGNOSIS — Z90.49: ICD-10-CM

## 2021-05-10 DIAGNOSIS — Z85.828: ICD-10-CM

## 2021-05-10 DIAGNOSIS — I50.9: ICD-10-CM

## 2021-05-10 PROCEDURE — 64454 NJX AA&/STRD GNCLR NRV BRNCH: CPT
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
## 2021-05-10 NOTE — PDOC4
PROCEDURE


Procedure


Patient was consented for bilateral genicular nerveknee blocks.  Risk were 

discussed including but not limited to bleeding infection possibility of 

intravascular injection sequelae spread to local anesthetic numbness side 

effects steroid medication exposure to fluoroscopy and poor results regarding 

pain control.  Patient understands wished to proceed.


Patient supine position under sterile prep and drape bilateral knees were 

identified and using C-arm fluoroscopic guidance in both AP and lateral views 

using 22-gauge needle x3 for genicular block superior medial and superior 

lateral genicular nerves and inferior medial genicular nerve.  Stylets were 

removed negative aspiration was confirmed and using 1 cc of contrast at each of 

the 3 injection sites showed good local spread without washout for all 3.  At 

this time bupivacaine 0.25%(12cc total- 2 cc/site) and total of 80 mg Depo-

Medrol.  Needles were removed and sterile bandages were applied.  Patient 

tolerated procedure well had no complications.











MIS MIRAMONTES MD               May 10, 2021 13:04

## 2021-05-24 ENCOUNTER — HOSPITAL ENCOUNTER (OUTPATIENT)
Dept: HOSPITAL 61 - PNCL | Age: 61
Discharge: HOME | End: 2021-05-24
Attending: ANESTHESIOLOGY
Payer: MEDICARE

## 2021-05-24 DIAGNOSIS — F17.210: ICD-10-CM

## 2021-05-24 DIAGNOSIS — F41.9: ICD-10-CM

## 2021-05-24 DIAGNOSIS — Z82.49: ICD-10-CM

## 2021-05-24 DIAGNOSIS — I10: ICD-10-CM

## 2021-05-24 DIAGNOSIS — G89.28: ICD-10-CM

## 2021-05-24 DIAGNOSIS — Z98.890: ICD-10-CM

## 2021-05-24 DIAGNOSIS — M19.90: ICD-10-CM

## 2021-05-24 DIAGNOSIS — M25.561: ICD-10-CM

## 2021-05-24 DIAGNOSIS — F32.9: ICD-10-CM

## 2021-05-24 DIAGNOSIS — E66.9: ICD-10-CM

## 2021-05-24 DIAGNOSIS — M25.562: Primary | ICD-10-CM

## 2021-05-24 DIAGNOSIS — Z85.828: ICD-10-CM

## 2021-05-24 DIAGNOSIS — Z79.899: ICD-10-CM

## 2021-05-24 DIAGNOSIS — K21.9: ICD-10-CM

## 2021-05-24 DIAGNOSIS — Z72.89: ICD-10-CM

## 2021-05-24 DIAGNOSIS — G47.30: ICD-10-CM

## 2021-05-24 PROCEDURE — 64454 NJX AA&/STRD GNCLR NRV BRNCH: CPT

## 2021-05-24 NOTE — PDOC4
PROCEDURE


Procedure


Patient was consented for bilateral genicular nerve blocks.  Risk were discussed

including but not limited to bleeding infection possibility of intravascular 

injection sequelae spread local anesthetic numbness side effects steroid 

medications post fluoroscopy and poor results regarding pain control.  Patient 

understands wished to proceed.


Patient supine position under sterile prep and drape, bilateral knees were 

identified and using C-arm fluoroscopic guidance in both AP and lateral views 

using 22-gauge needle x3 for genicular block superior medial and superior 

lateral genicular nerves and inferior medial genicular nerve.  Stylets were 

removed negative aspiration was confirmed and using 1 cc of contrast at each of 

the 3 injection sites, per side, showed good local spread without washout for 

all 3, bilaterally.  At this time bupivacaine 0.25%(6cc total- 2 cc/site) and 

total of 80 mg Depo-Medrol.  Needles were removed and sterile bandages were 

applied.  Patient tolerated procedure well had no complications.











MIS MIRAMONTES MD               May 24, 2021 10:09

## 2021-05-24 NOTE — PDOC
Progress Note - Pain Clinic


Date of Service:


DOS:


DATE: 5/24/21 


TIME: 10:04





Diagnosis:


Dx:


Bilateral knee joint pain with chronic postoperative pain right total knee 

replacement and left partial knee replacement





History or Present Illness:


HPI:


60-year-old male returns for follow-up status post bilateral genicular nerve 

blocks with percent improvement for the first 2 to 3 days and the pain gradually

returning over the next 2 to 3 days in the bilateral knees.  Patient reports 

during those first few days he was increasing his activity with greater ease and

comfort walking standing changing positions and felt very much improved.  

Patient reports the pain gradually returned to its baseline level where it is 

now patient reports a 10 on scale 10 is worse over the past week 8 on average 6 

its least is 8 today with weightbearing continuous pain aching and dull severe 

at times in the knees again right essentially equal to left.  Patient reports no

new motor or sensory deficits no new changes or other deficits.





Physical Exam:


VS:


Blood pressure is 146/85 pulse 85 respirations 20 temperature 98.1 F weight is 

317 pounds


PE:


PHYSICAL EXAMINATION:





GENERAL: The patient is awake, alert, oriented, appropriate, very pleasant 

demeanor


HEENT: Shows normocephalic, atraumatic.  Extraocular movements are intact and 

symmetrical.  Oral cavity: Mucous membranes moist and pink.  Dentition is 

intact.


NECK: Shows anterior throat supple without palpable lymphadenopathy noted.  

Swallow reflex symmetrical.


CHEST: Shows normal on inspection.  Breath sounds are clear bilaterally, distant

but no rales or rhonchi.


HEART: Shows S1, S2 clear.  No murmurs auscultated.


ABDOMEN: Soft, nontender, nondistended, obese.  No palpable organomegaly is 

noted.  


BACK: Shows spine grossly in the midline.  Normal-appearing cervical lordotic 

curvature.  There is slightly increased thoracic kyphosis, some minor flattening

of the lumbar lordotic curvature.  Lumbar paraspinous muscles show symmetrical 

on inspection, on palpation shows some moderate tenderness diffusely throughout 

the upper, middle and lower distribution of the paraspinous muscles, but without

specific trigger points, without radiation of pain.  The patient has good 

rotational motion of the lumbar spine, both laterally as well as extension and 

flexion without significant difficulty. 


EXTREMITIES: Lower extremities show deep tendon reflexes 1+ in the patellar and 

tendo calcaneus tendons.  Motor exam is 5 on a scale of 5 with right 

dorsiflexion, extension, quadriceps and hamstring flexion and 5/5 on the left.  

Peripheral pulses are 1+ posterior tibial.  No peripheral edema is noted bilater

ally.  Lower extremities are warm and dry to touch, equal in color and 

appearance.  Knees show well-healed surgical scarring with total knee 

replacement on the right and partial on the left.  Good range of motion without 

significant crepitus or ratcheting bilaterally.


SKIN: Shows warm and dry, good turgor.  No edema.  No sores, rashes or bruising 

throughout.





Procedure:


Procedure:


Options discussed with the patient.  Patient chart reviews his current 

medication regimen updated current review of systems updated today as well.  We 

will proceed with repeat bilateral genicular nerve blocks with fluoroscopic 

guidance today.  Risk were discussed including but not limited to bleeding 

infection possibility of intravascular injection sequelae spread local 

anesthetic numbness side effects of steroid medication exposure to fluoroscopy 

and portals regarding pain control.  Patient understands wished to proceed.  

Patient will return to clinic in approximately 2 weeks for follow-up, was couns

eled return appointment activity level and side effects beware.  We discussed 

potential radiofrequency ablation of the bilateral genicular nerves if repeat 

diagnostic blocks today are successful.  Patient understands and is interested 

in pursuing this.





Medication Injected:


Med Injected:


Patient supine position under sterile prep and drape, bilateral knees were 

identified and using C-arm fluoroscopic guidance in both AP and lateral views 

using 22-gauge needle x3 for genicular block superior medial and superior 

lateral genicular nerves and inferior medial genicular nerve.  Stylets were 

removed negative aspiration was confirmed and using 1 cc of contrast at each of 

the 3 injection sites, per side, showed good local spread without washout for 

all 3, bilaterally.  At this time bupivacaine 0.25%(6cc total- 2 cc/site) and 

total of 80 mg Depo-Medrol.  Needles were removed and sterile bandages were 

applied.  Patient tolerated procedure well had no complications.





Condition at Discharge:


Condition at Discharge:


Condition at discharge stable, patient tolerated procedure well and had no 

complications.











MIS MIRAMONTES MD               May 24, 2021 10:09

## 2021-06-09 ENCOUNTER — HOSPITAL ENCOUNTER (OUTPATIENT)
Dept: HOSPITAL 61 - PNCL | Age: 61
Discharge: HOME | End: 2021-06-09
Attending: ANESTHESIOLOGY
Payer: MEDICARE

## 2021-06-09 DIAGNOSIS — K21.9: ICD-10-CM

## 2021-06-09 DIAGNOSIS — M25.561: ICD-10-CM

## 2021-06-09 DIAGNOSIS — F17.210: ICD-10-CM

## 2021-06-09 DIAGNOSIS — Z79.899: ICD-10-CM

## 2021-06-09 DIAGNOSIS — M25.562: Primary | ICD-10-CM

## 2021-06-09 DIAGNOSIS — E66.9: ICD-10-CM

## 2021-06-09 DIAGNOSIS — M19.90: ICD-10-CM

## 2021-06-09 DIAGNOSIS — F32.9: ICD-10-CM

## 2021-06-09 DIAGNOSIS — G47.30: ICD-10-CM

## 2021-06-09 DIAGNOSIS — Z72.89: ICD-10-CM

## 2021-06-09 DIAGNOSIS — Z98.890: ICD-10-CM

## 2021-06-09 DIAGNOSIS — E11.9: ICD-10-CM

## 2021-06-09 DIAGNOSIS — F41.9: ICD-10-CM

## 2021-06-09 DIAGNOSIS — G89.29: ICD-10-CM

## 2021-06-09 DIAGNOSIS — I10: ICD-10-CM

## 2021-06-09 DIAGNOSIS — Z85.828: ICD-10-CM

## 2021-06-09 PROCEDURE — 64624 DSTRJ NULYT AGT GNCLR NRV: CPT

## 2021-06-09 NOTE — PDOC4
PROCEDURE


Procedure


Patient was consented for bilateral genicular nerve radiofrequency ablation with

fluoroscopic guidance.  Risk were discussed including but not limited to 

bleeding infection possibility of spread of local anesthetic numbness 

extravasation of local anesthetic and uptake with sequelae including 

resuscitative measures necessary, thermal damage to the surrounding structures 

as well as motor nerve damage with permanent ischemic damage, as well as 

exposure to fluoroscopy side effects steroid medication and portals regarding 

pain control.  Patient understands wished to proceed.


Patient supine position under sterile prep and drape bilateral knees were 

identified and using C-arm fluoroscopic guidance in both AP and lateral views 

using 22-gauge insulated radiofrequency needles x3 for each knee, genicular 

radiofrequency ablation of the superior medial and superior lateral genicular 

nerves and inferior medial genicular nerves.  Needles were placed at the 

superior medial and superior lateral distribution along the femoral to condylar 

junction and the tibial condylar junction for the inferior medial genicular 

nerve.  Stylets were removed and negative aspiration was confirmed and 

radiofrequency probes were placed within each of the 3 needles.  At this time 

sensory testing was carried out up to 3 V with good reproduction of pain in each

of the branches superior medial superior lateral and inferior medial genicular 

nerves individually.  At this time motor testing was carried out up to 2 V 

without motor effect on the lower extremity.  At this time probes were 

temporarily removed and 1 cc of lidocaine was injected at each of the 3 

sites.Probes were replaced, and radiofrequency ablation was performed. See 

radiofrequency flowsheet for levels, impedances, temperatures,duration, etc. 

Following ablation, solution containing 1 cc 0.25% bupivacaine and 20 mg Depo-

Medrol was injected at each of the injection sites.(Total of 6 cc 0.25% 

bupivacaine and 120 mg Depo-Medrol). Needles were removed and sterile bandages 

were applied.  Patient tolerated procedure well and had no complications.











MIS MIRAMONTES MD                Jun 9, 2021 15:16

## 2021-06-09 NOTE — PDOC
Progress Note - Pain Clinic


Date of Service:


DOS:


DATE: 6/9/21 


TIME: 14:52





Diagnosis:


Dx:


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


Bilateral joint arthroplasties total knee arthroplasty on the right and partial 

on the left





History or Present Illness:


HPI:


60-year-old male returns for follow-up status post bilateral genicular knee 

blocks x2 with 95% improvement after each block.  Patient reports it lasted 2 to

3 days and then the pain began to return fairly gradually but over the next few 

days is back to baseline.  Patient reports slightly more pain in the left knee 

to the right but present bilaterally patient reports a 9 on scale 10 is worse 

over the past week 7 on average/and is a 7 today patient ports aching and dull 

can be constant tight sharp and sometimes stabbing in the knees with 

weightbearing standing walking patient reports it generally does not awaken her 

from sleep but when he lays with his knees together it can awaken him usually 

from the right knee hurting at night.  Patient reports no new motor or sensory 

deficits no new changes.





Physical Exam:


VS:


Blood pressure is 125/86 pulse 96 respirations 16 temperature 98.1 F weight is 

322 pounds


PE:


PHYSICAL EXAMINATION:





GENERAL: The patient is awake, alert, oriented, appropriate, very pleasant 

demeanor


HEENT: Shows normocephalic, atraumatic.  Extraocular movements are intact and 

symmetrical.  


NECK: Shows anterior throat supple without palpable lymphadenopathy noted.  

Swallow reflex symmetrical.


CHEST: Shows normal on inspection.  Breath sounds are clear bilaterally.


HEART: Shows S1, S2 clear.  No murmurs auscultated.


ABDOMEN: Soft, nontender, nondistended, obese.  No palpable organomegaly is 

noted.  


BACK: Shows spine grossly in the midline.  Normal-appearing cervical lordotic 

curvature.  There is slightly increased thoracic kyphosis, some minor flattening

of the lumbar lordotic curvature.


EXTREMITIES: Lower extremities show deep tendon reflexes 1+ in the patellar and 

tendo calcaneus tendons.  Motor exam is 5 on a scale of 5 with right 

dorsiflexion, extension, quadriceps and hamstring flexion and 5/5 on the left.  

Peripheral pulses are 1+ posterior tibial.  No peripheral edema is noted 

bilaterally.  Lower extremities are warm and dry to touch, well-healed surgical 

scarring is noted over the bilateral anterior knees.  Patient shows good hinge 

motion of the bilateral knees without specific crepitus or ratcheting 

bilaterally.


SKIN: Shows warm and dry, good turgor.  No edema.  No sores, rashes or bruising 

throughout.





Procedure:


Procedure:


Options were discussed with the patient.  Patient chart was reviewed his current

medication regimen updated current review of systems updated today as well.  We 

will proceed with bilateral genicular nerves radiofrequency ablation with 

fluoroscopic guidance.  Nerves to include superior medial genicular nerve super

ior lateral genicular nerves and inferior medial genicular nerves, bilaterally. 

Risk discussed including but not limited to bleeding infection possibility of 

spread of local anesthetic and numbness extravasation of local anesthetic and 

uptake as well as sequelae including resuscitative measures as necessary 

exposure fluoroscopy possible thermal damage from radiofrequency ablation of 

surrounding structures including motor nerves and permanent ischemic damage as 

well as side effects of steroid medication and poor results regarding pain 

control.  Patient understands and wishes to proceed.  Patient will return to the

clinic in approximate 4 weeks for follow-up, was counseled as return appointment

activity level and side effects to be aware of.





Medication Injected:


Med Injected:


Patient supine position under sterile prep and drape bilateral knees were mary

ntified and using C-arm fluoroscopic guidance in both AP and lateral views using

22-gauge insulated radiofrequency needles x3 for each knee, genicular 

radiofrequency ablation of the superior medial and superior lateral genicular 

nerves and inferior medial genicular nerves.  Needles were placed at the 

superior medial and superior lateral distribution along the femoral to condylar 

junction and the tibial condylar junction for the inferior medial genicular 

nerve.  Stylets were removed and negative aspiration was confirmed and 

radiofrequency probes were placed within each of the 3 needles.  At this time 

sensory testing was carried out up to 3 V with good reproduction of pain in each

of the branches superior medial superior lateral and inferior medial genicular n

erves individually.  At this time motor testing was carried out up to 2 V 

without motor effect on the lower extremity.  At this time probes were 

temporarily removed and 1 cc of lidocaine was injected at each of the 3 

sites.Probes were replaced, and radiofrequency ablation was performed. See 

radiofrequency flowsheet for levels, impedances, temperatures,duration, etc. 

Following ablation, solution containing 1 cc 0.25% bupivacaine and 20 mg Depo-

Medrol was injected at each of the injection sites.(Total of 6 cc 0.25% 

bupivacaine and 120 mg Depo-Medrol). Needles were removed and sterile bandages 

were applied.  Patient tolerated procedure well and had no complications.





Condition at Discharge:


Condition at Discharge:


Condition at discharge stable, patient already procedure well and had no 

complications.











MIS MIRAMONTES MD                Jun 9, 2021 15:15

## 2021-07-07 ENCOUNTER — HOSPITAL ENCOUNTER (OUTPATIENT)
Dept: HOSPITAL 61 - PNCL | Age: 61
Discharge: HOME | End: 2021-07-07
Attending: ANESTHESIOLOGY
Payer: MEDICARE

## 2021-07-07 DIAGNOSIS — F32.9: ICD-10-CM

## 2021-07-07 DIAGNOSIS — M25.562: ICD-10-CM

## 2021-07-07 DIAGNOSIS — K21.9: ICD-10-CM

## 2021-07-07 DIAGNOSIS — I10: ICD-10-CM

## 2021-07-07 DIAGNOSIS — M19.90: ICD-10-CM

## 2021-07-07 DIAGNOSIS — E11.9: ICD-10-CM

## 2021-07-07 DIAGNOSIS — Z85.828: ICD-10-CM

## 2021-07-07 DIAGNOSIS — M25.561: Primary | ICD-10-CM

## 2021-07-07 DIAGNOSIS — F41.9: ICD-10-CM

## 2021-07-07 DIAGNOSIS — Z79.899: ICD-10-CM

## 2021-07-07 DIAGNOSIS — F17.210: ICD-10-CM

## 2021-07-07 DIAGNOSIS — Z72.89: ICD-10-CM

## 2021-07-07 DIAGNOSIS — Z98.890: ICD-10-CM

## 2021-07-07 DIAGNOSIS — G47.30: ICD-10-CM

## 2021-07-07 DIAGNOSIS — G89.29: ICD-10-CM

## 2021-07-07 DIAGNOSIS — E66.9: ICD-10-CM

## 2021-07-07 PROCEDURE — 99212 OFFICE O/P EST SF 10 MIN: CPT

## 2021-07-07 PROCEDURE — G0463 HOSPITAL OUTPT CLINIC VISIT: HCPCS

## 2021-07-07 NOTE — PDOC
Progress Note - Pain Clinic


Date of Service:


DOS:


DATE: 7/7/21 


TIME: 13:29





Diagnosis:


Dx:


Bilateral knee joint pain with chronic postoperative pain bilateral knee joints,

total knee arthroplasty right and partial left





History or Present Illness:


HPI:


60-year-old male returns follow-up status post bilateral genicular nerve blocks 

and radiofrequency ablation on June 9, 2021.  Patient reports the first few days

near 100% improvement but after about a week or so the pain began to return 

slowly but is back basically to his baseline level somewhat worse on the left 

than the right knee but present bilaterally patient reports is a 10 on scale 10 

is worse over the past week 8 on average 4 to sleep is a 4 today patient reports

aching dull constant severe with weightbearing better with sitting or laying 

down but still wakes him from sleep occasionally but not every night patient 

reports worse with standing walking weightbearing he is doing a lot of yard work

over the past week or so which is seems to exacerbate the pain as well.  Patient

reports no new motor or sensory deficits no bowel or bladder incontinence.  

Patient still taking over-the-counter anti-inflammatories also Percocet for the 

pain which does help low to moderate extent.  Patient reports some mild light 

bruising on his left knee after the injections otherwise no complications from 

the procedure itself.  Patient reports is not currently doing any physical 

therapies but does use his neighbors pool to swim as much as possible and this 

does help his knees while he is in the pool.  She reports she is attempting 

weight loss currently but has not had much luck thus far.





Physical Exam:


VS:


Blood pressure is 123/80 pulse 76 respirations 16 temperature 90.0 F weight is 

314 pounds


PE:


PHYSICAL EXAMINATION:





GENERAL: The patient is awake, alert, oriented, appropriate, very pleasant in 

demeanor


HEENT: Shows normocephalic, atraumatic.  Extraocular movements are intact and 

symmetrical.  Oral cavity: Mucous membranes moist and pink.  Dentition is 

intact.


NECK: Shows anterior throat supple without palpable lymphadenopathy noted.  

Swallow reflex symmetrical.


CHEST: Shows normal on inspection.  Breath sounds are clear bilaterally, no 

rales or rhonchi.


HEART: Shows S1, S2 clear.  No murmurs auscultated.


ABDOMEN: Soft, nontender, nondistended, obese. 


BACK: Shows spine grossly in the midline.  Normal-appearing cervical lordotic 

curvature.  There is slightly increased thoracic kyphosis, some minor flattening

of the lumbar lordotic curvature.  Lumbar paraspinous muscles show symmetrical 

on inspection, on palpation shows some moderate tenderness diffusely throughout 

the upper, middle and lower distribution of the paraspinous muscles, but without

specific trigger points, without radiation of pain.  The patient has good 

rotational motion of the lumbar spine, both laterally as well as extension and 

flexion without significant difficulty.  No tenderness over the spinous 

processes, sacrum or sacroiliac regions.


EXTREMITIES: Lower extremities show deep tendon reflexes 1+ in the patellar and 

tendo calcaneus tendons.  Motor exam is 5 on a scale of 5 with right 

dorsiflexion, extension, quadriceps and hamstring flexion and 5/5 on the left.  

Peripheral pulses are 1+ posterior tibial.  No peripheral edema is noted 

bilaterally.  Lower extremities are warm and dry to touch, equal in color and 

appearance.  Patient's knees show well-healed surgical scarring over both knees 

with palpation shows some moderate tenderness but only diffusely mostly in the 

medial collateral aspect and in the medial patellar aspect.  Patient's knees 

show good range of motion both actively and passively without ratcheting without

crepitus.


SKIN: Shows warm and dry, good turgor.  No edema.  No sores, rashes or bruising 

throughout.





Procedure:


Procedure:


Options were discussed with the patient.  Patient's chart was reviewed his his 

current medication regimen updated current review of systems updated today as 

well.  We will recommend physical therapy with pool therapy as well as weight 

loss techniques and strategies as well.  Patient will follow up after physical 

therapy is completed.  Also offered dietary counseling patient would like to try

this on his own first visit in the future as well if desired.





Medication Injected:


Med Injected:


None





Condition at Discharge:


Condition at Discharge:


Condition at discharge is stable.











MIS MIRAMONTES MD                Jul 7, 2021 13:35

## 2021-09-08 ENCOUNTER — HOSPITAL ENCOUNTER (OUTPATIENT)
Dept: HOSPITAL 61 - PNCL | Age: 61
Discharge: HOME | End: 2021-09-08
Attending: ANESTHESIOLOGY
Payer: MEDICARE

## 2021-09-08 DIAGNOSIS — E66.9: ICD-10-CM

## 2021-09-08 DIAGNOSIS — F17.210: ICD-10-CM

## 2021-09-08 DIAGNOSIS — I10: ICD-10-CM

## 2021-09-08 DIAGNOSIS — M25.561: Primary | ICD-10-CM

## 2021-09-08 DIAGNOSIS — Z96.653: ICD-10-CM

## 2021-09-08 DIAGNOSIS — Z79.899: ICD-10-CM

## 2021-09-08 DIAGNOSIS — G89.29: ICD-10-CM

## 2021-09-08 DIAGNOSIS — M25.562: ICD-10-CM

## 2021-09-08 DIAGNOSIS — E11.9: ICD-10-CM

## 2021-09-08 DIAGNOSIS — G47.30: ICD-10-CM

## 2021-09-08 DIAGNOSIS — Z98.890: ICD-10-CM

## 2021-09-08 DIAGNOSIS — Z79.84: ICD-10-CM

## 2021-09-08 DIAGNOSIS — K21.9: ICD-10-CM

## 2021-09-08 DIAGNOSIS — M19.90: ICD-10-CM

## 2021-09-08 DIAGNOSIS — F32.9: ICD-10-CM

## 2021-09-08 DIAGNOSIS — F41.9: ICD-10-CM

## 2021-09-08 PROCEDURE — 99212 OFFICE O/P EST SF 10 MIN: CPT

## 2021-09-08 PROCEDURE — G0463 HOSPITAL OUTPT CLINIC VISIT: HCPCS

## 2021-09-08 NOTE — PDOC
Progress Note - Pain Clinic


Date of Service:


DOS:


DATE: 9/8/21 


TIME: 10:32





Diagnosis:


Dx:


Bilateral knee joint pain with chronic postoperative pain status post total knee

arthroplasty right and partial left





History or Present Illness:


HPI:


60-year-old male returns for follow-up status post radiofrequency ablation 

bilateral genicular nerves June 9, 2021 patient reports he did near 100% 

improvement for the first week or so but the pain returned in the bilateral 

knees after that time..  We discussed this on his last visit which July 7, 2021 

set him up for water therapy which he did complete and has been doing it in his 

neighbors pool as well which is helpful patient reports while he is in the water

that he has almost no pain in his knees with walking or standing patient reports

once he gets out even climbing the ladder to get out of the pool the pain 

returns fairly quickly in both bilateral knees.  Patient reports she is taking 

30 mg of morphine every morning and every afternoon also 6 tablets of oxycodone 

5 mg throughout the day prescribed by his primary physician.  Patient reports 

this does not control the pain he has significant pain in the bilateral knees 

again with walking and some in the low back patient reports no new motor or 

sensory deficits no bowel or bladder incontinence provoked his pain is a 9 on 

scale 10 is worse over the past week 7 on average/and is a 7 today.  Patient 

reports that he is "desperate" to get some pain relief in his knees outside of 

pool therapy.  We had discussed weight loss techniques with him on his last 

visit however he has been unable to accomplish this as he is unable to walk or 

exercise fairly easily outside of the water secondary to the pain in his knees.





Physical Exam:


VS:


Blood pressure is 164/95 pulse 78 respirations 18 temperature 90.8 F height 511

inches weight is 311 pounds


PE:


PHYSICAL EXAMINATION:





GENERAL: The patient is awake, alert, oriented, appropriate, very pleasant 

demeanor


HEENT: Shows normocephalic, atraumatic.  Extraocular movements are intact and 

symmetrical.  Oral cavity: Mucous membranes moist and pink.  Dentition is 

intact.


NECK: Shows anterior throat supple without palpable lymphadenopathy noted.  

Swallow reflex symmetrical.


CHEST: Shows normal on inspection.  Breath sounds are clear bilaterally, distant

no rales rhonchi or wheezes auscultated.


HEART: Shows S1, S2 clear.  No murmurs auscultated.


ABDOMEN: Soft, nontender, nondistended, obese.  No palpable organomegaly is 

noted.  


BACK: Shows spine grossly in the midline.  Normal-appearing cervical lordotic 

curvature.  There is slightly increased thoracic kyphosis, some minor flattening

of the lumbar lordotic curvature.  Lumbar paraspinous muscles show symmetrical 

on inspection, on palpation shows some moderate tenderness diffusely throughout 

the upper, middle and lower distribution of the paraspinous muscles without 

specific trigger points, without radiation of pain.  The patient has good 

rotational motion of the lumbar spine, both laterally as well as extension and 

flexion without significant difficulty.


EXTREMITIES: Lower extremities show deep tendon reflexes 1 in the patellar and 

tendo calcaneus tendons.  Motor exam is 5 on a scale of 5 with right 

dorsiflexion, extension, quadriceps and hamstring flexion and 5/5 on the left.  

Peripheral pulses are 1 posterior tibial.  No peripheral edema is noted 

bilaterally.  Lower extremities are warm and dry to touch, equal in color and 

appearance.  Bilateral knees show surgical scarring well-healed, good range of 

motion without crepitus or ratcheting, moderate tenderness in the medial 

collateral ligament bilaterally but without radiation.  No masses posteriorly no

swelling bilaterally.


SKIN: Shows warm and dry, good turgor.  No edema.  No sores, rashes or bruising 

throughout.





Procedure:


Procedure:


Options were discussed with the patient.  Patient chart reviews his current me

dication regimen updated current review of systems updated today as well.  We 

discussed continued pool therapy also weight loss techniques patient was unable 

to accomplish these very significantly at this time.





Medication Injected:


Med Injected:


None





Condition at Discharge:


Condition at Discharge:


Condition at discharge is stable.











MIS MIRAMONTES MD                Sep 8, 2021 10:37

## 2021-11-24 ENCOUNTER — HOSPITAL ENCOUNTER (OUTPATIENT)
Dept: HOSPITAL 61 - SLPLAB | Age: 61
End: 2021-11-24
Attending: FAMILY MEDICINE
Payer: MEDICARE

## 2021-11-24 DIAGNOSIS — G47.33: Primary | ICD-10-CM

## 2021-11-24 DIAGNOSIS — G47.34: ICD-10-CM

## 2021-11-24 PROCEDURE — 95810 POLYSOM 6/> YRS 4/> PARAM: CPT

## 2021-11-30 NOTE — SLEEP
DATE OF STUDY: 11/24/2021

SLEEP STUDY



ATTENDING PHYSICIAN:  Samir Santos MD.



The patient is a 61-year-old who weighs 306 pounds with a BMI of 43.  The 

patient's Irvine score was 1.  The patient underwent split night study 

performed at [facility].



During the night study, the patient spent 513 minutes in bed and slept for 472 

minutes with a sleep efficiency of 92%.  Sleep latency was 17 minutes with a REM

latency of 271 minutes.  Sleep architecture showed normal stage 1 sleep, 

increased stage 2 sleep, absent slow wave and reduced REM sleep.



During the initial diagnostic portion of the study, the patient slept for 121 

minutes.  During that time, the patient had 56 obstructive apneas, no central or

mixed apneas and 98 hypopneas.  The patient's AHI was 76 per hour with no supine

or REM sleep during the diagnostic portion.



EKG monitoring revealed no arrhythmias.



PLMs were seen at index of 4 per hour and 1 per hour caused EEG arousals.



Nocturnal oximetry study revealed an average oxygen saturation of 86% with a 

lowest of 75%.  95% of the time, oxygen saturation remained between 80% and 89%.



The patient was started on CPAP at 5 cm water and titrated up to 11 cm water.  

At the final pressure, the patient slept for 351 minutes.  The patient's AHI was

reduced to 0 per hour.  The patient's oxygen saturation remained above 88% most 

of the time with one spot desaturation of 78%.  The patient had supine as well 

as REM sleep.



IMPRESSION:

1.  Severe obstructive sleep apnea at an AHI of 76 per hour.

2.  Nocturnal hypoxia secondary to obstructive sleep apnea, but resolved with 

CPAP.

3.  No significant periodic limb movements.



RECOMMENDATIONS:

1.  CPAP at 11 cm water completely eliminated the patient's sleep apnea and 

should be used on a nightly basis.  The patient used medium-sized nasal pillows.

2.  Follow up in 4-6 weeks to assess compliance and to document clinical 

improvement.

3.  Weight loss is strongly advised.

4.  Avoid CNS depressants.

5.  Cautioned regarding driving until symptoms of sleep apnea resolve with the 

use of CPAP.







JOSIE/KIM

DR: Abdirizak   DD: 11/30/2021 11:03

DT: 11/30/2021 11:16   TID: [id number]

CC:     SAMIR SANTOS MD

## 2021-12-09 ENCOUNTER — HOSPITAL ENCOUNTER (OUTPATIENT)
Dept: HOSPITAL 61 - PNCL | Age: 61
Discharge: HOME | End: 2021-12-09
Attending: ANESTHESIOLOGY
Payer: MEDICARE

## 2021-12-09 DIAGNOSIS — M25.562: ICD-10-CM

## 2021-12-09 DIAGNOSIS — Z79.899: ICD-10-CM

## 2021-12-09 DIAGNOSIS — F32.9: ICD-10-CM

## 2021-12-09 DIAGNOSIS — E66.9: ICD-10-CM

## 2021-12-09 DIAGNOSIS — Z98.890: ICD-10-CM

## 2021-12-09 DIAGNOSIS — I10: ICD-10-CM

## 2021-12-09 DIAGNOSIS — M17.0: ICD-10-CM

## 2021-12-09 DIAGNOSIS — Z72.89: ICD-10-CM

## 2021-12-09 DIAGNOSIS — G47.30: ICD-10-CM

## 2021-12-09 DIAGNOSIS — Z85.828: ICD-10-CM

## 2021-12-09 DIAGNOSIS — E11.9: ICD-10-CM

## 2021-12-09 DIAGNOSIS — G89.29: ICD-10-CM

## 2021-12-09 DIAGNOSIS — F17.210: ICD-10-CM

## 2021-12-09 DIAGNOSIS — K21.9: ICD-10-CM

## 2021-12-09 DIAGNOSIS — F41.9: ICD-10-CM

## 2021-12-09 DIAGNOSIS — M25.561: Primary | ICD-10-CM

## 2021-12-09 PROCEDURE — 64624 DSTRJ NULYT AGT GNCLR NRV: CPT

## 2021-12-09 PROCEDURE — 64454 NJX AA&/STRD GNCLR NRV BRNCH: CPT

## 2021-12-09 NOTE — PDOC4
Procedure Note:


ICD 10 Code:


ICD 10 Code:


M2 5.563


M1 7.13





Procedure Note:


Patient was consented for bilateral genicular radiofrequency ablation with 

fluoroscopic guidance.  Risks discussed including but not limited to bleeding 

infection possibility of vascular injection sequelae thermal damage to 

surrounding structures as well as permanent damage to motor nerves as well as 

poor results regarding pain control.  Patient understands wished to proceed.


Patient supine position under sterile prep and drape bilateral knees were 

identified and using C-arm fluoroscopic guidance in both AP and lateral views 

using 22-gauge insulated radiofrequency needles x3 for each knee, genicular 

radiofrequency ablation of the superior medial and superior lateral genicular 

nerves and inferior medial genicular nerves.  Needles were placed at the 

superior medial and superior lateral distribution along the femoral to condylar 

junction and the tibial condylar junction for the inferior medial genicular 

nerve.  Stylets were removed and negative aspiration was confirmed and 

radiofrequency probes were placed within each of the 3 needles.  At this time 

sensory testing was carried out up to 3 V with good reproduction of pain in each

of the branches superior medial superior lateral and inferior medial genicular 

nerves individually.  At this time motor testing was carried out up to 2 V 

without motor effect on the lower extremity.  At this time probes were 

temporarily removed and 1 cc of lidocaine was injected at each of the 3 

sites.Probes were replaced, and radiofrequency ablation was performed. See 

radiofrequency flowsheet for levels, impedances, temperatures,duration, etc. 

Following ablation, solution containing 1 cc 0.25% bupivacaine and 20 mg Depo-

Medrol was injected at each of the injection sites.(Total of 6 cc 0.25% 

bupivacaine and 120 mg Depo-Medrol). Needles were removed and sterile bandages 

were applied.  Patient tolerated procedure well and had no complications.











MIS MIRAMONTES MD                Dec 9, 2021 12:47

## 2021-12-09 NOTE — PDOC
Progress Note - Pain Clinic


Date of Service:


DOS:


DATE: 12/9/21 


TIME: 12:47





Diagnosis:


Dx:


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





History or Present Illness:


HPI:


61-year-old male returns for follow-up status post bilateral genicular blocks 

and radiofrequency ablation June 9, 2021 patient had about 1-1/2 days of 

decreased pain by 100% and the pain returned we discussed this in great detail 

with him over the past few months as well as physical therapies and weight loss 

strategies patient is unable to do these currently and we discussed repeating 

radiofrequency ablation of the genicular nerves bilateral superior medial 

superior lateral and inferior medial branches of the genicular nerves.  Patient 

reports much better when he is of his feet, but weightbearing standing walking 

stairs steps etc. exacerbates the pain significantly which limits his daily 

activities severely.  Patient rates pain is a 10 on scale 10 is worst 8 on 

average 3 at its least is an 8 today patient comes as dull burning constant with

weightbearing in the bilateral knees right essentially equal to left.  Patient 

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 

the left.





Physical Exam:


VS:


Blood pressure is 142/81 pulse 73 respirations 20 temperature 98.2 F weight is 

308 pounds


PE:


PHYSICAL EXAMINATION:





GENERAL: The patient is awake, alert, oriented, appropriate, very pleasant in 

demeanor


HEENT: Shows normocephalic, atraumatic.  Extraocular movements are intact and 

symmetrical.  Oral cavity: Mucous membranes moist and pink.  Dentition is 

intact.


NECK: Shows anterior throat supple without palpable lymphadenopathy noted.  

Swallow reflex symmetrical.


CHEST: Shows normal on inspection.  Breath sounds are clear bilaterally, distant

but no rales or rhonchi.


HEART: Shows S1, S2 clear.  No murmurs auscultated.


ABDOMEN: Soft, nontender, nondistended, obese.  No palpable organomegaly is 

noted. 


BACK: Shows spine grossly in the midline.  Normal-appearing cervical lordotic 

curvature.  There is slightly increased thoracic kyphosis, some minor flattening

of the lumbar lordotic curvature.


EXTREMITIES: Lower extremities show deep tendon reflexes 1 in the patellar and 

tendo calcaneus tendons.  Motor exam is 5 on a scale of 5 with right 

dorsiflexion, extension, quadriceps and hamstring flexion and 5/5 on the left.  

Peripheral pulses are 1 posterior tibial.  No peripheral edema is noted 

bilaterally.  Lower extremities are warm and dry to touch, equal in color and 

appearance.  Patient's knees show well-healed surgical scarring over both knees 

motion of the knee shows good range of motion without specific ratcheting or 

crepitus.


SKIN: Shows warm and dry, good turgor.  No edema.  No sores, rashes or bruising 

throughout.





Procedure:


Procedure:


Options discussed with the patient.  Patient's old chart was reviewed, as was 

his current medication regimen reviewed today.  We will proceed with bilateral 

genicular radiofrequency ablation with fluoroscopic guidance.  Risk were 

discussed including but not limited to bleeding infection possibility of local 

anesthetic injection spread with numbness as well as possible thermal damage to 

the surrounding nerves and motor nerves with permanent ischemic damage.  Patient

understands wishes to proceed.  Patient will return to clinic in approximately 3

weeks for follow-up, was counseled as return appointment, activity level, and 

side effect to be aware of.





Medication Injected:


Med Injected:


Patient supine position under sterile prep and drape bilateral knees were 

identified and using C-arm fluoroscopic guidance in both AP and lateral views 

using 22-gauge insulated radiofrequency needles x3 for each knee, genicular 

radiofrequency ablation of the superior medial and superior lateral genicular 

nerves and inferior medial genicular nerves.  Needles were placed at the superi

or medial and superior lateral distribution along the femoral to condylar 

junction and the tibial condylar junction for the inferior medial genicular 

nerve.  Stylets were removed and negative aspiration was confirmed and 

radiofrequency probes were placed within each of the 3 needles.  At this time 

sensory testing was carried out up to 3 V with good reproduction of pain in each

of the branches superior medial superior lateral and inferior medial genicular 

nerves individually.  At this time motor testing was carried out up to 2 V 

without motor effect on the lower extremity.  At this time probes were 

temporarily removed and 1 cc of lidocaine was injected at each of the 3 

sites.Probes were replaced, and radiofrequency ablation was performed. See 

radiofrequency flowsheet for levels, impedances, temperatures,duration, etc. 

Following ablation, solution containing 1 cc 0.25% bupivacaine and 20 mg Depo-

Medrol was injected at each of the injection sites.(Total of 6 cc 0.25% 

bupivacaine and 120 mg Depo-Medrol). Needles were removed and sterile bandages 

were applied.  Patient tolerated procedure well and had no complications.





Condition at Discharge:


Condition at Discharge:


Condition at discharge is stable, patient tolerated the procedure well and had 

no complications.











MIS MIRAMONTES MD                Dec 9, 2021 12:52